# Patient Record
(demographics unavailable — no encounter records)

---

## 2025-01-24 NOTE — HISTORY OF PRESENT ILLNESS
[de-identified] : CC: dysphagia    HISTORY OF PRESENT ILLNESS: RASHI MONTERO is a 22 year male who presents today with loss of voice x 3 mo  he is accompanied by his mother and is communicating via a notepad he reports speaking frequently for his work and lost his voice completely in October. he has been to multiple EDs and has been given antibiotics he has associated dysphagia and has not been able to eat except for soup, although he hasn't not lost significant weight he reports a tugging sensation on the L side of his neck when he attempts to speak hydration mildly relieves his symptoms otherwise healthy, does not take medications regularly    REVIEW OF SYSTEMS: General ROS: negative for - chills, fatigue or fever Psychological ROS: negative for - anxiety or depression Ophthalmic ROS: negative for - blurry vision, decreased vision or double vision ENT ROS: negative except as noted from HPI Allergy and Immunology ROS: negative except as noted from HPI Hematological and Lymphatic ROS: negative for - bleeding problems Endocrine ROS: negative for - malaise/lethargy Respiratory ROS: negative for - stridor Cardiovascular ROS: negative for - chest pain Gastrointestinal ROS: negative for - appetite loss or nausea/vomiting Genitourinary ROS: negative for - incontinence Musculoskeletal ROS: negative for - gait disturbance Neurological ROS: negative for - behavioral changes Dermatological ROS: negative for - nail changes   Physical Exam:   GENERAL APPEARANCE: Well-developed and No Acute Distress. COMMUNICATION: Able to Communicate. Strong Voice.   HEAD AND FACE Eyes: Testing of ocular motility including primary gaze alignment normal. Inspection and Appearance: No evidence of lesions or masses Palpation: Palpation of the face reveals no sinus tenderness Salivary Glands: Symmetric without masses Facial Strength: Symmetric without evidence of facial paralysis   EAR, NOSE, MOUTH, and THROAT: Ear Canals and Tympanic Membranes, Bilateral: No evidence of inflammation or lesions. Thresholds: Clinical speech reception thresholds normal. External, Nose and Auricle: No lesions or masses. Nasal, Mucosa, Septum, and Turbinates: See endoscopy.   NECK: Evaluation: No evidence of masses or crepitus. The neck is symmetric and the trachea is in the midline position. Thyroid: No evidence of enlargement, tenderness or mass. Neck Lymph Nodes: WNL. Respiratory: Inspection of the chest including symmetry, expansion and/or assessment of respiratory effort normal. Cardiovascular: Evaluation of peripheral vascular system by observation and palpation of capillary refill, normal. Neurological/Psychiatric: Alert, Oriented, Mood, and Affect Normal.   PROCEDURE: Flexible laryngoscopy with topical anesthesia (47000)ANESTHESIA: Topical with 4% Lidocaine   INDICATION FOR PROCEDURE: Unable to perform complete exam with mirror laryngoscopy   PREOPERATIVE DIAGNOSIS:    POSTOPERATIVE DIAGNOSIS: same as above   SURGEON: Marbin Lafleur MD   Prior to the procedure, I had a discussion with the patient regarding the risks, benefits, and alternatives of the procedure and a verbal consent was obtained.   INSTRUMENTS: Flexible scope   OPERATIVE PROCEDURE NOTE:   Patient was taken to the endoscopy suite where the nose and the pharynx were anesthetized with topical Pontocaine in a spray form. After adequate anesthesia of the nasal cavity and the pharynx, the fiberoptic endoscope was inserted through the nasal cavity. The palate was identified for patency. The nasopharynx, oropharynx, hypopharynx and the larynx were examined, along with the base of tongue. Structural examination of vocal cord movement was carried out and the larynx was examined during phonation and deglutition. Gestures, such as cough, laughing and respiration were also performed to look for laryngeal abnormalities.   EXAM FINDINGS: severe DNS to L with spur, vocal cords moving appropriately, no lesions or nodules, some arytenoid erythema   The nasal cavity mucosa was pink and normal. No nasal cavity lesions or masses were observed. No signs of infection or pus.   The nasopharynx was clear. No mass or lesion visualized.   The tongue was surface was found to be normal appearing. No lesions or masses seen in the pharyngeal, hypopharyngeal, or laryngeal airway. Mucosa appeared normal. The vocal cords were mobile bilaterally. Airway intact.   IMPRESSION: RASHI MONTERO is a 22 year male who presents today with dysphonia, neck strain, LPR   PLAN: - start aleve BID for 10 days, prescribed  - pepcid in PM, prescribed - refer to [facility] for neck PT - increase fluid hydration - RTC PRN     Marbin Lafleur MD [facility] Rhinology and Skull Base Surgery Department of Otolaryngology- Head and Neck Surgery [facility]

## 2025-01-24 NOTE — HISTORY OF PRESENT ILLNESS
[de-identified] : CC: dysphagia    HISTORY OF PRESENT ILLNESS: RASHI MONTERO is a 22 year male who presents today with loss of voice x 3 mo  he is accompanied by his mother and is communicating via a notepad he reports speaking frequently for his work and lost his voice completely in October. he has been to multiple EDs and has been given antibiotics he has associated dysphagia and has not been able to eat except for soup, although he hasn't not lost significant weight he reports a tugging sensation on the L side of his neck when he attempts to speak hydration mildly relieves his symptoms otherwise healthy, does not take medications regularly    REVIEW OF SYSTEMS: General ROS: negative for - chills, fatigue or fever Psychological ROS: negative for - anxiety or depression Ophthalmic ROS: negative for - blurry vision, decreased vision or double vision ENT ROS: negative except as noted from HPI Allergy and Immunology ROS: negative except as noted from HPI Hematological and Lymphatic ROS: negative for - bleeding problems Endocrine ROS: negative for - malaise/lethargy Respiratory ROS: negative for - stridor Cardiovascular ROS: negative for - chest pain Gastrointestinal ROS: negative for - appetite loss or nausea/vomiting Genitourinary ROS: negative for - incontinence Musculoskeletal ROS: negative for - gait disturbance Neurological ROS: negative for - behavioral changes Dermatological ROS: negative for - nail changes   Physical Exam:   GENERAL APPEARANCE: Well-developed and No Acute Distress. COMMUNICATION: Able to Communicate. Strong Voice.   HEAD AND FACE Eyes: Testing of ocular motility including primary gaze alignment normal. Inspection and Appearance: No evidence of lesions or masses Palpation: Palpation of the face reveals no sinus tenderness Salivary Glands: Symmetric without masses Facial Strength: Symmetric without evidence of facial paralysis   EAR, NOSE, MOUTH, and THROAT: Ear Canals and Tympanic Membranes, Bilateral: No evidence of inflammation or lesions. Thresholds: Clinical speech reception thresholds normal. External, Nose and Auricle: No lesions or masses. Nasal, Mucosa, Septum, and Turbinates: See endoscopy.   NECK: Evaluation: No evidence of masses or crepitus. The neck is symmetric and the trachea is in the midline position. Thyroid: No evidence of enlargement, tenderness or mass. Neck Lymph Nodes: WNL. Respiratory: Inspection of the chest including symmetry, expansion and/or assessment of respiratory effort normal. Cardiovascular: Evaluation of peripheral vascular system by observation and palpation of capillary refill, normal. Neurological/Psychiatric: Alert, Oriented, Mood, and Affect Normal.   PROCEDURE: Flexible laryngoscopy with topical anesthesia (57479)ANESTHESIA: Topical with 4% Lidocaine   INDICATION FOR PROCEDURE: Unable to perform complete exam with mirror laryngoscopy   PREOPERATIVE DIAGNOSIS:    POSTOPERATIVE DIAGNOSIS: same as above   SURGEON: Marbin Lafleur MD   Prior to the procedure, I had a discussion with the patient regarding the risks, benefits, and alternatives of the procedure and a verbal consent was obtained.   INSTRUMENTS: Flexible scope   OPERATIVE PROCEDURE NOTE:   Patient was taken to the endoscopy suite where the nose and the pharynx were anesthetized with topical Pontocaine in a spray form. After adequate anesthesia of the nasal cavity and the pharynx, the fiberoptic endoscope was inserted through the nasal cavity. The palate was identified for patency. The nasopharynx, oropharynx, hypopharynx and the larynx were examined, along with the base of tongue. Structural examination of vocal cord movement was carried out and the larynx was examined during phonation and deglutition. Gestures, such as cough, laughing and respiration were also performed to look for laryngeal abnormalities.   EXAM FINDINGS: severe DNS to L with spur, vocal cords moving appropriately, no lesions or nodules, some arytenoid erythema   The nasal cavity mucosa was pink and normal. No nasal cavity lesions or masses were observed. No signs of infection or pus.   The nasopharynx was clear. No mass or lesion visualized.   The tongue was surface was found to be normal appearing. No lesions or masses seen in the pharyngeal, hypopharyngeal, or laryngeal airway. Mucosa appeared normal. The vocal cords were mobile bilaterally. Airway intact.   IMPRESSION: RASHI MONTERO is a 22 year male who presents today with dysphonia, neck strain, LPR   PLAN: - start aleve BID for 10 days, prescribed  - pepcid in PM, prescribed - refer to [facility] for neck PT - increase fluid hydration - RTC PRN     Marbin Lafleur MD [facility] Rhinology and Skull Base Surgery Department of Otolaryngology- Head and Neck Surgery [facility]

## 2025-01-24 NOTE — HISTORY OF PRESENT ILLNESS
[de-identified] : CC: dysphagia    HISTORY OF PRESENT ILLNESS: RASHI MONTERO is a 22 year male who presents today with loss of voice x 3 mo  he is accompanied by his mother and is communicating via a notepad he reports speaking frequently for his work and lost his voice completely in October. he has been to multiple EDs and has been given antibiotics he has associated dysphagia and has not been able to eat except for soup, although he hasn't not lost significant weight he reports a tugging sensation on the L side of his neck when he attempts to speak hydration mildly relieves his symptoms otherwise healthy, does not take medications regularly    REVIEW OF SYSTEMS: General ROS: negative for - chills, fatigue or fever Psychological ROS: negative for - anxiety or depression Ophthalmic ROS: negative for - blurry vision, decreased vision or double vision ENT ROS: negative except as noted from HPI Allergy and Immunology ROS: negative except as noted from HPI Hematological and Lymphatic ROS: negative for - bleeding problems Endocrine ROS: negative for - malaise/lethargy Respiratory ROS: negative for - stridor Cardiovascular ROS: negative for - chest pain Gastrointestinal ROS: negative for - appetite loss or nausea/vomiting Genitourinary ROS: negative for - incontinence Musculoskeletal ROS: negative for - gait disturbance Neurological ROS: negative for - behavioral changes Dermatological ROS: negative for - nail changes   Physical Exam:   GENERAL APPEARANCE: Well-developed and No Acute Distress. COMMUNICATION: Able to Communicate. Strong Voice.   HEAD AND FACE Eyes: Testing of ocular motility including primary gaze alignment normal. Inspection and Appearance: No evidence of lesions or masses Palpation: Palpation of the face reveals no sinus tenderness Salivary Glands: Symmetric without masses Facial Strength: Symmetric without evidence of facial paralysis   EAR, NOSE, MOUTH, and THROAT: Ear Canals and Tympanic Membranes, Bilateral: No evidence of inflammation or lesions. Thresholds: Clinical speech reception thresholds normal. External, Nose and Auricle: No lesions or masses. Nasal, Mucosa, Septum, and Turbinates: See endoscopy.   NECK: Evaluation: No evidence of masses or crepitus. The neck is symmetric and the trachea is in the midline position. Thyroid: No evidence of enlargement, tenderness or mass. Neck Lymph Nodes: WNL. Respiratory: Inspection of the chest including symmetry, expansion and/or assessment of respiratory effort normal. Cardiovascular: Evaluation of peripheral vascular system by observation and palpation of capillary refill, normal. Neurological/Psychiatric: Alert, Oriented, Mood, and Affect Normal.   PROCEDURE: Flexible laryngoscopy with topical anesthesia (35009)ANESTHESIA: Topical with 4% Lidocaine   INDICATION FOR PROCEDURE: Unable to perform complete exam with mirror laryngoscopy   PREOPERATIVE DIAGNOSIS:    POSTOPERATIVE DIAGNOSIS: same as above   SURGEON: Marbin Lafleur MD   Prior to the procedure, I had a discussion with the patient regarding the risks, benefits, and alternatives of the procedure and a verbal consent was obtained.   INSTRUMENTS: Flexible scope   OPERATIVE PROCEDURE NOTE:   Patient was taken to the endoscopy suite where the nose and the pharynx were anesthetized with topical Pontocaine in a spray form. After adequate anesthesia of the nasal cavity and the pharynx, the fiberoptic endoscope was inserted through the nasal cavity. The palate was identified for patency. The nasopharynx, oropharynx, hypopharynx and the larynx were examined, along with the base of tongue. Structural examination of vocal cord movement was carried out and the larynx was examined during phonation and deglutition. Gestures, such as cough, laughing and respiration were also performed to look for laryngeal abnormalities.   EXAM FINDINGS: severe DNS to L with spur, vocal cords moving appropriately, no lesions or nodules, some arytenoid erythema   The nasal cavity mucosa was pink and normal. No nasal cavity lesions or masses were observed. No signs of infection or pus.   The nasopharynx was clear. No mass or lesion visualized.   The tongue was surface was found to be normal appearing. No lesions or masses seen in the pharyngeal, hypopharyngeal, or laryngeal airway. Mucosa appeared normal. The vocal cords were mobile bilaterally. Airway intact.   IMPRESSION: RASHI MONTERO is a 22 year male who presents today with dysphonia, neck strain, LPR   PLAN: - start aleve BID for 10 days, prescribed  - pepcid in PM, prescribed - refer to [facility] for neck PT - increase fluid hydration - RTC PRN     Marbin Lafleur MD [facility] Rhinology and Skull Base Surgery Department of Otolaryngology- Head and Neck Surgery [facility]